# Patient Record
(demographics unavailable — no encounter records)

---

## 2024-10-31 NOTE — IMAGING
[de-identified] : RIGHT KNEE  Inspection:  mild effusion  Palpation: medial joint line tenderness, anterior tenderness  Knee Range of Motion:  3-125   Strength: 5/5 Quadriceps strength, 5/5 Hamstring strength  Neurological: light touch is intact throughout  Ligament Stability and Special Tests:   McMurrays: neg  Lachman: neg  Pivot Shift: neg  Posterior Drawer: neg  Valgus: neg  Varus: neg  Patella Apprehension: neg  Patella Maltracking: neg

## 2024-10-31 NOTE — HISTORY OF PRESENT ILLNESS
[de-identified] : Date of Injury/Onset: 01/01/2022 Pain: At Rest:1 With Activity:7 Mechanism of injury: This is NOT a Work Related Injury being treated under Worker's Compensation. This is NOT an athletic injury occurring associated with an interscholastic or organized sports team. Quality of symptoms: Improves with: Worse with: Prior treatment: Excedrin / knee brace Prior Imaging: x-ray Reports Available For Review Today:no Out of work/sport: School/Sport/Position/Occupation: Personal goal: Additional Information:  10/31/2024 STELLA MALIK is a 72 year -old male here today for RT Knee . patient states he has been having pain in the right knee. Patient states two years ago he hyper-extended his knee mowing lawn. patient describes pain as throbbing pain at times numb and tingling .Patient had x-ray done by primary care physician. Patient here today or second opinion

## 2024-10-31 NOTE — DISCUSSION/SUMMARY
[de-identified] :   - We discussed their diagnosis and treatment options at length including the risks and benefits of both surgical treatment with a knee replacement and non-surgical options.  - We will continue conservative treatment with activity modification, PT, icing, weight loss, and anti-inflammatory medications.  - The patient was provided with a PT prescription to work on ROM, hip ER/abductors strengthening, quad/hamstring stretches and strengthening, and other exercises.  - The patient was advised to let pain guide the gradual advancement of activities.  - We discussed the possible of injections in the future.  - Follow up as needed in 6 weeks as to re-evaluate  nex tvisit: consider csi

## 2024-10-31 NOTE — DATA REVIEWED
[FreeTextEntry1] : Right X-Ray Examination of the KNEE (4 views): medial and patellofemoral degenerate changes.

## 2024-11-25 NOTE — HISTORY OF PRESENT ILLNESS
[de-identified] : Date of Injury/Onset: 01/01/2022 Pain: At Rest:1 With Activity:7 Mechanism of injury: This is NOT a Work Related Injury being treated under Worker's Compensation. This is NOT an athletic injury occurring associated with an interscholastic or organized sports team. Quality of symptoms: Improves with: Worse with: Prior treatment: Excedrin / knee brace Prior Imaging: x-ray Reports Available For Review Today:no Out of work/sport: School/Sport/Position/Occupation: Personal goal: Additional Information:  10/31/2024 STELLA MALIK is a 72 year -old male here today for RT Knee . patient states he has been having pain in the right knee. Patient states two years ago he hyper-extended his knee mowing lawn. patient describes pain as throbbing pain at times numb and tingling .Patient had x-ray done by primary care physician. Patient here today or second opinion  11/25/2024 STELLA is here today to follow up on Right knee. Patient states no changes since last visit. He started PT today, and states feels improved after PT. He declines CSI at this time, would like to first try PT.

## 2024-11-25 NOTE — DISCUSSION/SUMMARY
[de-identified] :   - We discussed their diagnosis and treatment options at length including the risks and benefits of both surgical treatment with a knee replacement and non-surgical options.  - We will continue conservative treatment with activity modification, PT, icing, weight loss, and anti-inflammatory medications.  - The patient was provided with a PT prescription to work on ROM, hip ER/abductors strengthening, quad/hamstring stretches and strengthening, and other exercises.  - The patient was advised to let pain guide the gradual advancement of activities.  - We discussed the possible of injections in the future.  - Follow up as needed in 6 weeks as to re-evaluate  nex tvisit: consider csi *** 11/25 R knee OA Rec: PT mobic knee sleeve not interested in injection today  RTC 6 weeks  next visit: consider csi

## 2024-11-25 NOTE — IMAGING
[de-identified] : RIGHT KNEE  Inspection:  mild effusion  Palpation: medial joint line tenderness, anterior tenderness  Knee Range of Motion:  3-125   Strength: 5/5 Quadriceps strength, 5/5 Hamstring strength  Neurological: light touch is intact throughout  Ligament Stability and Special Tests:   McMurrays: neg  Lachman: neg  Pivot Shift: neg  Posterior Drawer: neg  Valgus: neg  Varus: neg  Patella Apprehension: neg  Patella Maltracking: neg

## 2025-01-06 NOTE — DISCUSSION/SUMMARY
[de-identified] :   - We discussed their diagnosis and treatment options at length including the risks and benefits of both surgical treatment with a knee replacement and non-surgical options.  - We will continue conservative treatment with activity modification, PT, icing, weight loss, and anti-inflammatory medications.  - The patient was provided with a PT prescription to work on ROM, hip ER/abductors strengthening, quad/hamstring stretches and strengthening, and other exercises.  - The patient was advised to let pain guide the gradual advancement of activities.  - We discussed the possible of injections in the future.  - Follow up as needed in 6 weeks as to re-evaluate  next visit: consider csi *** 11/25 R knee OA Rec: PT mobic knee sleeve not interested in injection today  RTC 6 weeks  next visit: consider csi  **** 1/6/25 R knee OA Rec: PT mobic knee sleeve not interested in injection today RTC 6 weeks  next visit: consider csi ***  Due to the patients mechanical symptoms along with medial joint line pain, effusion, and pos ludmila test on exam we will get an mri to eval for medial meniscus tear    - The patient was advised of the diagnosis.  The natural history of the pathology was explained to the patient in layman's terms.  Several different treatment options were discussed and explained including the risks and benefits of both surgical and non-surgical treatments.  - The patient was advised to apply ice (wrapped in a towel or protective covering) to the area daily (20 minutes at a time, 2-4X/day).  - The patient was advised to modify their activities.  - f/u after mri

## 2025-01-06 NOTE — HISTORY OF PRESENT ILLNESS
[de-identified] : Date of Injury/Onset: 01/01/2022 Pain: At Rest:1 With Activity:7 Mechanism of injury: This is NOT a Work Related Injury being treated under Worker's Compensation. This is NOT an athletic injury occurring associated with an interscholastic or organized sports team. Quality of symptoms: Improves with: Worse with: Prior treatment: Excedrin / knee brace Prior Imaging: x-ray Reports Available For Review Today:no Out of work/sport: School/Sport/Position/Occupation: Personal goal: Additional Information:  10/31/2024 STELLA MALIK is a 72 year -old male here today for RT Knee . patient states he has been having pain in the right knee. Patient states two years ago he hyper-extended his knee mowing lawn. patient describes pain as throbbing pain at times numb and tingling .Patient had x-ray done by primary care physician. Patient here today or second opinion  11/25/2024 STELLA is here today to follow up on Right knee. Patient states no changes since last visit. He started PT today, and states feels improved after PT. He declines CSI at this time, would like to first try PT.   01/06/2025 Pt returns for follow up on right knee. Patient is doing PT, and taking meloxicam. Reports significant improvement with pain since last visit. Not interested in CSI today.

## 2025-01-06 NOTE — IMAGING
[de-identified] : RIGHT KNEE  Inspection:  mild effusion  Palpation: lateral joint line tenderness   Knee Range of Motion:  0-130   Strength: 5/5 Quadriceps strength, 5/5 Hamstring strength  Neurological: light touch is intact throughout  Ligament Stability and Special Tests:   McMurrays: Positive  Lachman: neg  Pivot Shift: neg  Posterior Drawer: neg  Valgus: neg  Varus: neg  Patella Apprehension: neg  Patella Maltracking: neg